# Patient Record
Sex: MALE
[De-identification: names, ages, dates, MRNs, and addresses within clinical notes are randomized per-mention and may not be internally consistent; named-entity substitution may affect disease eponyms.]

---

## 2023-10-20 ENCOUNTER — APPOINTMENT (OUTPATIENT)
Dept: INTERNAL MEDICINE | Facility: CLINIC | Age: 58
End: 2023-10-20

## 2023-12-01 ENCOUNTER — APPOINTMENT (OUTPATIENT)
Dept: INTERNAL MEDICINE | Facility: CLINIC | Age: 58
End: 2023-12-01

## 2024-07-01 ENCOUNTER — TRANSCRIPTION ENCOUNTER (OUTPATIENT)
Age: 59
End: 2024-07-01

## 2024-07-01 ENCOUNTER — APPOINTMENT (OUTPATIENT)
Dept: MRI IMAGING | Facility: HOSPITAL | Age: 59
End: 2024-07-01

## 2024-07-09 ENCOUNTER — APPOINTMENT (OUTPATIENT)
Dept: UROLOGY | Facility: CLINIC | Age: 59
End: 2024-07-09

## 2024-07-09 DIAGNOSIS — Z80.42 FAMILY HISTORY OF MALIGNANT NEOPLASM OF PROSTATE: ICD-10-CM

## 2024-07-09 DIAGNOSIS — R97.20 ELEVATED PROSTATE, SPECIFIC ANTIGEN [PSA]: ICD-10-CM

## 2024-07-09 DIAGNOSIS — A60.00 HERPESVIRAL INFECTION OF UROGENITAL SYSTEM, UNSPECIFIED: ICD-10-CM

## 2024-07-09 DIAGNOSIS — R39.9 UNSPECIFIED SYMPTOMS AND SIGNS INVOLVING THE GENITOURINARY SYSTEM: ICD-10-CM

## 2024-07-11 LAB — BACTERIA UR CULT: NORMAL

## 2024-08-06 ENCOUNTER — TRANSCRIPTION ENCOUNTER (OUTPATIENT)
Age: 59
End: 2024-08-06

## 2024-08-07 ENCOUNTER — APPOINTMENT (OUTPATIENT)
Dept: UROLOGY | Facility: AMBULATORY SURGERY CENTER | Age: 59
End: 2024-08-07

## 2024-08-07 ENCOUNTER — RESULT REVIEW (OUTPATIENT)
Age: 59
End: 2024-08-07

## 2024-08-07 ENCOUNTER — TRANSCRIPTION ENCOUNTER (OUTPATIENT)
Age: 59
End: 2024-08-07

## 2024-08-07 ENCOUNTER — NON-APPOINTMENT (OUTPATIENT)
Age: 59
End: 2024-08-07

## 2024-08-08 ENCOUNTER — NON-APPOINTMENT (OUTPATIENT)
Age: 59
End: 2024-08-08

## 2024-08-16 ENCOUNTER — APPOINTMENT (OUTPATIENT)
Dept: UROLOGY | Facility: CLINIC | Age: 59
End: 2024-08-16
Payer: COMMERCIAL

## 2024-08-16 ENCOUNTER — NON-APPOINTMENT (OUTPATIENT)
Age: 59
End: 2024-08-16

## 2024-08-16 DIAGNOSIS — C61 MALIGNANT NEOPLASM OF PROSTATE: ICD-10-CM

## 2024-08-16 PROCEDURE — G2211 COMPLEX E/M VISIT ADD ON: CPT | Mod: NC

## 2024-08-16 PROCEDURE — 99215 OFFICE O/P EST HI 40 MIN: CPT

## 2024-08-16 NOTE — LETTER BODY
[Please see my note below.] : Please see my note below. [FreeTextEntry2] : Mounika Hopson MD [FreeTextEntry1] :   Dear Doctor,     I had the opportunity to see your patient, Mr. JENNIFER HALL in followup. I am enclosing my office note for your information.   I will keep you informed of any developments.   Feel free to contact me if you have any questions.   Sincerely,   Chase Puga MD, FACS Professor of Urology [facility], [place] [facility] of [place] [address]   Office Telephone [phone]   Fax [phone]

## 2024-08-16 NOTE — ASSESSMENT
[FreeTextEntry1] :  Patient presents with a strong family history of prostate cancer.  His brother developed prostate cancer at the age of 50 and underwent a radical prostatectomy.  His father developed in his 70s and was treated with radiation therapy and androgen deprivation therapy..  His mother had breast cancer and his sister underwent BRCA screening which demonstrated positive gene.  We discussed the potential significance of this.  He has a daughter.  We did discuss the need for continuous monitoring of this PSA.  The patient is acutely aware of this.  His PSAs over the last 10 years are as outlined below: patient reports  PSA 10 years ago 1.1          9 years ago  1.7          8 years ago 2.1          5 years ago 2.7          1 year ago  3..0  We will assess his current PSA. We discussed BRCA testing Patient does not want to assess at this time  DIscussed LUTS flow 20.1 ; voided 175; 110 cc residual Pt understands that some of the most common side effect  of this medication include dizziness, dry mouth, fatigue, lightheadedness, palpitations. Pt informed to stop the medication should any of these occur.  Discussed "retrograde ejaculation" failure of emission from medication. 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 We discussed that procedure is performed by placing a rectal probe and administering anesthesia locally. Biopsies are then taken with a needle. Multiple biopsies are taken,  Risks of the procedure include hematuria, hematospermia, blood per rectum. Risks include infection, sepsis and even death. We discussed perineal vs transrectal biopsy We discussed decreased risk of perineal biopsy vs transrectal but associated greater discomfort  We discussed options of: 1. local, 2. IV sedation in Operating room 3. po Valium Fleets enema should be taken. ASA and NSAID must be stopped 1 week prior to procedure.    8.16.2024 patient returns re BIOPSY  Final Diagnosis 1. Prostate, left anterior apex, needle biopsy -Benign prostate tissue. 2. Prostate, left anterior base, needle biopsy -Benign prostate tissue. 3. Prostate, right anterior apex, needle biopsy -Benign prostate tissue. 4. Prostate, right anterior base, needle biopsy -Benign prostate tissue. 5. Prostate, midline apex, needle biopsy -Adenocarcinoma of the prostate, Prognostic Grade Group 1 ([place] score 3+3=6) involving 70% (7 mm in length) of 1 core. The diagnosis of carcinoma is supported by the failure of immunohistochemical staining for high molecular weight cytokeratin and p63 to demonstrate basal cells in the atypical glands, and the immunohistochemical stain for alpha-methylacyl-CoA racemase (P504S), a marker preferentially expressed in prostate cancer, is positive.  7. Prostate, left posterior apex, needle biopsy -Adenocarcinoma of the prostate, Prognostic Grade Group 1 (Reema score 3+3=6) involving 70% (7 mm in length) of 1 core.  8. Prostate, left posterior base, needle biopsy -Adenocarcinoma of the prostate, Prognostic Grade Group 1 ([place] score 3+3=6) involving 10% (1 mm in length) of 1 core.  9. Prostate, right posterior apex, needle biopsy -Adenocarcinoma of the prostate, Prognostic Grade Group 1 (Reema score 3+3=6) involving 10% (1 mm in length) of 1 core.  10. Prostate, right posterior base, needle biopsy -Adenocarcinoma of the prostate, Prognostic Grade Group 1 ([place] score 3+3=6) involving 10% (1 mm in length) of 1 core. The diagnosis of carcinoma is supported by the failure of immunohistochemical staining for high molecular weight cytokeratin and p63 to demonstrate basal cells in the atypical glands, and the immunohistochemical stain for alpha-methylacyl-CoA racemase (P504S), a marker preferentially expressed in prostate cancer, is positive.  11. Prostate, left lateral, needle biopsy -Prostate tissue with a small focus of atypical glands.  By itself, negative staining for high molecular weight cytokeratin and P63 in a small focus of glands, as seen in this case, is not diagnostic of cancer. The immunohistochemical stain for alpha-methyacyl-coA racemase (P504S) is positive.  12. Prostate, right lateral, needle biopsy -Adenocarcinoma of the prostate, Prognostic Grade Group 1 ([place] score 3+3=6) involving 5% (<1 mm in length) of 1 core.  13. Prostate, target, right peripheral, needle biopsy -Adenocarcinoma of the prostate, Prognostic Grade Group 2 (Reema score 3+4=7) involving 10%, 10%, 60%, 20% and 70% (1 mm, 1 mm, 10 mm, 3 mm and 12 mm in length) of 5/5 cores. -[place] pattern 4 comprises less than 10% of tumor. -Perineural invasion is identified. The diagnosis of carcinoma is supported by the failure of immunohistochemical staining for high molecular weight cytokeratin and p63 to demonstrate basal cells in the atypical glands, and the immunohistochemical stain for alpha-methylacyl-CoA racemase (P504S), a marker preferentially expressed in prostate cancer, is positive.  Note: No large cribriform Reema pattern 4 identified in this case.  We reviewed significance of GLEASONS SCORE   We discussed GRADE GROUP regarding risk stratification  Favorable intermediate	[id number] OR  [place] score 3+4 = 7/grade group 2 OR  PSA 10 to 20 ng/mL  AND Percentage of positive biopsy cores <50% 	   PROSTATE CANCER STAGING PSA: Reema's score, number of cores, extent of cores  We discussed risk classification based on Reema's score, PSA and % of cores positive Favorable intermediate	[id number] OR  Reema score 3+4 = 7/grade group 2 OR  PSA 10 to 20 ng/mL  AND Percentage of positive biopsy cores <50% 	 Unfavorable intermediate	[id number] OR  Reema score 3+4 = 7/grade group 2 or [place] score 4+3 = 7/grade group 3 OR  PSA 10 to 20 ng/mL 	   We discussed treatment options:  1. RT and radical prostatectomy are both appropriate  options for men with intermediate-risk disease.  2.  Active surveillance is an option for for those with favorable intermediate-risk disease. Patient informed that this comes with a higher risk of developing metastases compared  with definitive treatment.   The choice of a specific approach requires a  consideration of the benefits and risks associated with each approach, taking  into account the patient's age, individual preferences and comorbidities, and  estimated life expectancy.  We had an extensive conversation regarding   Risks and complications of each were discussed with special reference to sexual and urinary function  We discussed fact that I had presented his case to [facility] this am and the overall sentiment was to proceed with therapy based up: 1. family history 2. patients demonstrated concern (actively tracking PSA) 3. extensive 6 and reema 7 (3+4) However active surveillance would not be unreasonable if patient understands and accepts risks of missed opportunity for cure and regimen of active surveillance (repeat MRI and Biopsies as indicated) I offered referral to radiation oncology and surgery Patient declined at this time and want to "educate" himself first We discussed importance of DECIPHER score in decision making He will return in 3 weeks to review options and decipher score at that time

## 2024-08-16 NOTE — END OF VISIT
[Time Spent: ___ minutes] : I have spent [unfilled] minutes of time on the encounter.
Statement Selected

## 2024-08-16 NOTE — HISTORY OF PRESENT ILLNESS
[FreeTextEntry1] : 59 year old  single straight not sexually actve presents : 1 with brother (+) PCA at age of 50s; treated with RALP 2. father with prostate cancer at 79 treated with ADT and ERT 3. sister underwent BRCA testing   7.9.2024 on flomax IPSS 21 patient aware of improved flow  when forgets flow decreased

## 2024-08-16 NOTE — LETTER BODY
[Please see my note below.] : Please see my note below. [FreeTextEntry2] : Mounika Hopson MD [FreeTextEntry1] :   Dear Doctor,     I had the opportunity to see your patient, Mr. JENNIFER HALL in followup. I am enclosing my office note for your information.   I will keep you informed of any developments.   Feel free to contact me if you have any questions.   Sincerely,   Chase Puga MD, FACS Professor of Urology [facility] of [place] [address]   Office Telephone [phone]   Fax [phone]

## 2024-08-16 NOTE — ASSESSMENT
[FreeTextEntry1] :  Patient presents with a strong family history of prostate cancer.  His brother developed prostate cancer at the age of 50 and underwent a radical prostatectomy.  His father developed in his 70s and was treated with radiation therapy and androgen deprivation therapy..  His mother had breast cancer and his sister underwent BRCA screening which demonstrated positive gene.  We discussed the potential significance of this.  He has a daughter.  We did discuss the need for continuous monitoring of this PSA.  The patient is acutely aware of this.  His PSAs over the last 10 years are as outlined below: patient reports  PSA 10 years ago 1.1          9 years ago  1.7          8 years ago 2.1          5 years ago 2.7          1 year ago  3..0  We will assess his current PSA. We discussed BRCA testing Patient does not want to assess at this time  DIscussed LUTS flow 20.1 ; voided 175; 110 cc residual Pt understands that some of the most common side effect  of this medication include dizziness, dry mouth, fatigue, lightheadedness, palpitations. Pt informed to stop the medication should any of these occur.  Discussed "retrograde ejaculation" failure of emission from medication. 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 We discussed that procedure is performed by placing a rectal probe and administering anesthesia locally. Biopsies are then taken with a needle. Multiple biopsies are taken,  Risks of the procedure include hematuria, hematospermia, blood per rectum. Risks include infection, sepsis and even death. We discussed perineal vs transrectal biopsy We discussed decreased risk of perineal biopsy vs transrectal but associated greater discomfort  We discussed options of: 1. local, 2. IV sedation in Operating room 3. po Valium Fleets enema should be taken. ASA and NSAID must be stopped 1 week prior to procedure.    8.16.2024 patient returns re BIOPSY  Final Diagnosis 1. Prostate, left anterior apex, needle biopsy -Benign prostate tissue. 2. Prostate, left anterior base, needle biopsy -Benign prostate tissue. 3. Prostate, right anterior apex, needle biopsy -Benign prostate tissue. 4. Prostate, right anterior base, needle biopsy -Benign prostate tissue. 5. Prostate, midline apex, needle biopsy -Adenocarcinoma of the prostate, Prognostic Grade Group 1 (Lincoln score 3+3=6) involving 70% (7 mm in length) of 1 core. The diagnosis of carcinoma is supported by the failure of immunohistochemical staining for high molecular weight cytokeratin and p63 to demonstrate basal cells in the atypical glands, and the immunohistochemical stain for alpha-methylacyl-CoA racemase (P504S), a marker preferentially expressed in prostate cancer, is positive.  7. Prostate, left posterior apex, needle biopsy -Adenocarcinoma of the prostate, Prognostic Grade Group 1 (Reema score 3+3=6) involving 70% (7 mm in length) of 1 core.  8. Prostate, left posterior base, needle biopsy -Adenocarcinoma of the prostate, Prognostic Grade Group 1 (Lincoln score 3+3=6) involving 10% (1 mm in length) of 1 core.  9. Prostate, right posterior apex, needle biopsy -Adenocarcinoma of the prostate, Prognostic Grade Group 1 (Reema score 3+3=6) involving 10% (1 mm in length) of 1 core.  10. Prostate, right posterior base, needle biopsy -Adenocarcinoma of the prostate, Prognostic Grade Group 1 (Lincoln score 3+3=6) involving 10% (1 mm in length) of 1 core. The diagnosis of carcinoma is supported by the failure of immunohistochemical staining for high molecular weight cytokeratin and p63 to demonstrate basal cells in the atypical glands, and the immunohistochemical stain for alpha-methylacyl-CoA racemase (P504S), a marker preferentially expressed in prostate cancer, is positive.  11. Prostate, left lateral, needle biopsy -Prostate tissue with a small focus of atypical glands.  By itself, negative staining for high molecular weight cytokeratin and P63 in a small focus of glands, as seen in this case, is not diagnostic of cancer. The immunohistochemical stain for alpha-methyacyl-coA racemase (P504S) is positive.  12. Prostate, right lateral, needle biopsy -Adenocarcinoma of the prostate, Prognostic Grade Group 1 (Lincoln score 3+3=6) involving 5% (<1 mm in length) of 1 core.  13. Prostate, target, right peripheral, needle biopsy -Adenocarcinoma of the prostate, Prognostic Grade Group 2 (Reema score 3+4=7) involving 10%, 10%, 60%, 20% and 70% (1 mm, 1 mm, 10 mm, 3 mm and 12 mm in length) of 5/5 cores. -Lincoln pattern 4 comprises less than 10% of tumor. -Perineural invasion is identified. The diagnosis of carcinoma is supported by the failure of immunohistochemical staining for high molecular weight cytokeratin and p63 to demonstrate basal cells in the atypical glands, and the immunohistochemical stain for alpha-methylacyl-CoA racemase (P504S), a marker preferentially expressed in prostate cancer, is positive.  Note: No large cribriform Reema pattern 4 identified in this case.  We reviewed significance of GLEASONS SCORE   We discussed GRADE GROUP regarding risk stratification  Favorable intermediate	[id number] OR  Lincoln score 3+4 = 7/grade group 2 OR  PSA 10 to 20 ng/mL  AND Percentage of positive biopsy cores <50% 	   PROSTATE CANCER STAGING PSA: Reema's score, number of cores, extent of cores  We discussed risk classification based on Reema's score, PSA and % of cores positive Favorable intermediate	[id number] OR  Reema score 3+4 = 7/grade group 2 OR  PSA 10 to 20 ng/mL  AND Percentage of positive biopsy cores <50% 	 Unfavorable intermediate	[id number] OR  Reema score 3+4 = 7/grade group 2 or Lincoln score 4+3 = 7/grade group 3 OR  PSA 10 to 20 ng/mL 	   We discussed treatment options:  1. RT and radical prostatectomy are both appropriate  options for men with intermediate-risk disease.  2.  Active surveillance is an option for for those with favorable intermediate-risk disease. Patient informed that this comes with a higher risk of developing metastases compared  with definitive treatment.   The choice of a specific approach requires a  consideration of the benefits and risks associated with each approach, taking  into account the patient's age, individual preferences and comorbidities, and  estimated life expectancy.  We had an extensive conversation regarding   Risks and complications of each were discussed with special reference to sexual and urinary function  We discussed fact that I had presented his case to [facility] this am and the overall sentiment was to proceed with therapy based up: 1. family history 2. patients demonstrated concern (actively tracking PSA) 3. extensive 6 and reema 7 (3+4) However active surveillance would not be unreasonable if patient understands and accepts risks of missed opportunity for cure and regimen of active surveillance (repeat MRI and Biopsies as indicated) I offered referral to radiation oncology and surgery Patient declined at this time and want to "educate" himself first We discussed importance of DECIPHER score in decision making He will return in 3 weeks to review options and decipher score at that time

## 2024-08-16 NOTE — LETTER BODY
[Please see my note below.] : Please see my note below. [FreeTextEntry2] : Mounika Hopson MD [FreeTextEntry1] :   Dear Doctor,     I had the opportunity to see your patient, Mr. JENNIFER HALL in followup. I am enclosing my office note for your information.   I will keep you informed of any developments.   Feel free to contact me if you have any questions.   Sincerely,   Chase Puga MD, FACS Professor of Urology [facility] of [place] [address]   Office Telephone [phone]   Fax [phone]

## 2024-08-16 NOTE — ASSESSMENT
[FreeTextEntry1] :  Patient presents with a strong family history of prostate cancer.  His brother developed prostate cancer at the age of 50 and underwent a radical prostatectomy.  His father developed in his 70s and was treated with radiation therapy and androgen deprivation therapy..  His mother had breast cancer and his sister underwent BRCA screening which demonstrated positive gene.  We discussed the potential significance of this.  He has a daughter.  We did discuss the need for continuous monitoring of this PSA.  The patient is acutely aware of this.  His PSAs over the last 10 years are as outlined below: patient reports  PSA 10 years ago 1.1          9 years ago  1.7          8 years ago 2.1          5 years ago 2.7          1 year ago  3..0  We will assess his current PSA. We discussed BRCA testing Patient does not want to assess at this time  DIscussed LUTS flow 20.1 ; voided 175; 110 cc residual Pt understands that some of the most common side effect  of this medication include dizziness, dry mouth, fatigue, lightheadedness, palpitations. Pt informed to stop the medication should any of these occur.  Discussed "retrograde ejaculation" failure of emission from medication. 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 We discussed that procedure is performed by placing a rectal probe and administering anesthesia locally. Biopsies are then taken with a needle. Multiple biopsies are taken,  Risks of the procedure include hematuria, hematospermia, blood per rectum. Risks include infection, sepsis and even death. We discussed perineal vs transrectal biopsy We discussed decreased risk of perineal biopsy vs transrectal but associated greater discomfort  We discussed options of: 1. local, 2. IV sedation in Operating room 3. po Valium Fleets enema should be taken. ASA and NSAID must be stopped 1 week prior to procedure.    8.16.2024 patient returns re BIOPSY  Final Diagnosis 1. Prostate, left anterior apex, needle biopsy -Benign prostate tissue. 2. Prostate, left anterior base, needle biopsy -Benign prostate tissue. 3. Prostate, right anterior apex, needle biopsy -Benign prostate tissue. 4. Prostate, right anterior base, needle biopsy -Benign prostate tissue. 5. Prostate, midline apex, needle biopsy -Adenocarcinoma of the prostate, Prognostic Grade Group 1 (Salcha score 3+3=6) involving 70% (7 mm in length) of 1 core. The diagnosis of carcinoma is supported by the failure of immunohistochemical staining for high molecular weight cytokeratin and p63 to demonstrate basal cells in the atypical glands, and the immunohistochemical stain for alpha-methylacyl-CoA racemase (P504S), a marker preferentially expressed in prostate cancer, is positive.  7. Prostate, left posterior apex, needle biopsy -Adenocarcinoma of the prostate, Prognostic Grade Group 1 (Reema score 3+3=6) involving 70% (7 mm in length) of 1 core.  8. Prostate, left posterior base, needle biopsy -Adenocarcinoma of the prostate, Prognostic Grade Group 1 (Salcha score 3+3=6) involving 10% (1 mm in length) of 1 core.  9. Prostate, right posterior apex, needle biopsy -Adenocarcinoma of the prostate, Prognostic Grade Group 1 (Reema score 3+3=6) involving 10% (1 mm in length) of 1 core.  10. Prostate, right posterior base, needle biopsy -Adenocarcinoma of the prostate, Prognostic Grade Group 1 (Salcha score 3+3=6) involving 10% (1 mm in length) of 1 core. The diagnosis of carcinoma is supported by the failure of immunohistochemical staining for high molecular weight cytokeratin and p63 to demonstrate basal cells in the atypical glands, and the immunohistochemical stain for alpha-methylacyl-CoA racemase (P504S), a marker preferentially expressed in prostate cancer, is positive.  11. Prostate, left lateral, needle biopsy -Prostate tissue with a small focus of atypical glands.  By itself, negative staining for high molecular weight cytokeratin and P63 in a small focus of glands, as seen in this case, is not diagnostic of cancer. The immunohistochemical stain for alpha-methyacyl-coA racemase (P504S) is positive.  12. Prostate, right lateral, needle biopsy -Adenocarcinoma of the prostate, Prognostic Grade Group 1 (Salcha score 3+3=6) involving 5% (<1 mm in length) of 1 core.  13. Prostate, target, right peripheral, needle biopsy -Adenocarcinoma of the prostate, Prognostic Grade Group 2 (Reema score 3+4=7) involving 10%, 10%, 60%, 20% and 70% (1 mm, 1 mm, 10 mm, 3 mm and 12 mm in length) of 5/5 cores. -Salcha pattern 4 comprises less than 10% of tumor. -Perineural invasion is identified. The diagnosis of carcinoma is supported by the failure of immunohistochemical staining for high molecular weight cytokeratin and p63 to demonstrate basal cells in the atypical glands, and the immunohistochemical stain for alpha-methylacyl-CoA racemase (P504S), a marker preferentially expressed in prostate cancer, is positive.  Note: No large cribriform Reema pattern 4 identified in this case.  We reviewed significance of GLEASONS SCORE   We discussed GRADE GROUP regarding risk stratification  Favorable intermediate	[id number] OR  Salcha score 3+4 = 7/grade group 2 OR  PSA 10 to 20 ng/mL  AND Percentage of positive biopsy cores <50% 	   PROSTATE CANCER STAGING PSA: Reema's score, number of cores, extent of cores  We discussed risk classification based on Reema's score, PSA and % of cores positive Favorable intermediate	[id number]-[id number] OR  Reema score 3+4 = 7/grade group 2 OR  PSA 10 to 20 ng/mL  AND Percentage of positive biopsy cores <50% 	 Unfavorable intermediate	[id number] OR  Reema score 3+4 = 7/grade group 2 or Salcha score 4+3 = 7/grade group 3 OR  PSA 10 to 20 ng/mL 	   We discussed treatment options:  1. RT and radical prostatectomy are both appropriate  options for men with intermediate-risk disease.  2.  Active surveillance is an option for for those with favorable intermediate-risk disease. Patient informed that this comes with a higher risk of developing metastases compared  with definitive treatment.   The choice of a specific approach requires a  consideration of the benefits and risks associated with each approach, taking  into account the patient's age, individual preferences and comorbidities, and  estimated life expectancy.  We had an extensive conversation regarding   Risks and complications of each were discussed with special reference to sexual and urinary function  We discussed fact that I had presented his case to [facility] this am and the overall sentiment was to proceed with therapy based up: 1. family history 2. patients demonstrated concern (actively tracking PSA) 3. extensive 6 and reema 7 (3+4) However active surveillance would not be unreasonable if patient understands and accepts risks of missed opportunity for cure and regimen of active surveillance (repeat MRI and Biopsies as indicated) I offered referral to radiation oncology and surgery Patient declined at this time and want to "educate" himself first We discussed importance of DECIPHER score in decision making He will return in 3 weeks to review options and decipher score at that time

## 2024-09-06 ENCOUNTER — APPOINTMENT (OUTPATIENT)
Dept: UROLOGY | Facility: CLINIC | Age: 59
End: 2024-09-06
Payer: COMMERCIAL

## 2024-09-06 VITALS
HEART RATE: 81 BPM | SYSTOLIC BLOOD PRESSURE: 140 MMHG | TEMPERATURE: 97.3 F | BODY MASS INDEX: 28.99 KG/M2 | WEIGHT: 214 LBS | DIASTOLIC BLOOD PRESSURE: 96 MMHG | OXYGEN SATURATION: 95 % | HEIGHT: 72 IN

## 2024-09-06 DIAGNOSIS — C61 MALIGNANT NEOPLASM OF PROSTATE: ICD-10-CM

## 2024-09-06 PROCEDURE — 99214 OFFICE O/P EST MOD 30 MIN: CPT

## 2024-09-06 NOTE — LETTER BODY
[Please see my note below.] : Please see my note below. [FreeTextEntry2] : Mounika Hopson MD [FreeTextEntry1] :   Dear Doctor,     I had the opportunity to see your patient, Mr. JENNIFER HALL in followup. I am enclosing my office note for your information.   I will keep you informed of any developments.   Feel free to contact me if you have any questions.   Sincerely,   Chase Puga MD, FACS Professor of Urology [facility] [place] [address]   Office Telephone [phone]   Fax [phone]

## 2024-09-06 NOTE — ASSESSMENT
[FreeTextEntry1] :  Patient presents with a strong family history of prostate cancer.  His brother developed prostate cancer at the age of 50 and underwent a radical prostatectomy.  His father developed in his 70s and was treated with radiation therapy and androgen deprivation therapy..  His mother had breast cancer and his sister underwent BRCA screening which demonstrated positive gene.  We discussed the potential significance of this.  He has a daughter.  We did discuss the need for continuous monitoring of this PSA.  The patient is acutely aware of this.  His PSAs over the last 10 years are as outlined below: patient reports  PSA 10 years ago 1.1          9 years ago  1.7          8 years ago 2.1          5 years ago 2.7          1 year ago  3..0  We will assess his current PSA. We discussed BRCA testing Patient does not want to assess at this time  DIscussed LUTS flow 20.1 ; voided 175; 110 cc residual Pt understands that some of the most common side effect  of this medication include dizziness, dry mouth, fatigue, lightheadedness, palpitations. Pt informed to stop the medication should any of these occur.  Discussed "retrograde ejaculation" failure of emission from medication. 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 We discussed that procedure is performed by placing a rectal probe and administering anesthesia locally. Biopsies are then taken with a needle. Multiple biopsies are taken,  Risks of the procedure include hematuria, hematospermia, blood per rectum. Risks include infection, sepsis and even death. We discussed perineal vs transrectal biopsy We discussed decreased risk of perineal biopsy vs transrectal but associated greater discomfort  We discussed options of: 1. local, 2. IV sedation in Operating room 3. po Valium Fleets enema should be taken. ASA and NSAID must be stopped 1 week prior to procedure.    8.16.2024 patient returns re BIOPSY  Final Diagnosis 1. Prostate, left anterior apex, needle biopsy -Benign prostate tissue. 2. Prostate, left anterior base, needle biopsy -Benign prostate tissue. 3. Prostate, right anterior apex, needle biopsy -Benign prostate tissue. 4. Prostate, right anterior base, needle biopsy -Benign prostate tissue. 5. Prostate, midline apex, needle biopsy -Adenocarcinoma of the prostate, Prognostic Grade Group 1 ([place] score 3+3=6) involving 70% (7 mm in length) of 1 core. The diagnosis of carcinoma is supported by the failure of immunohistochemical staining for high molecular weight cytokeratin and p63 to demonstrate basal cells in the atypical glands, and the immunohistochemical stain for alpha-methylacyl-CoA racemase (P504S), a marker preferentially expressed in prostate cancer, is positive.  7. Prostate, left posterior apex, needle biopsy -Adenocarcinoma of the prostate, Prognostic Grade Group 1 (Reema score 3+3=6) involving 70% (7 mm in length) of 1 core.  8. Prostate, left posterior base, needle biopsy -Adenocarcinoma of the prostate, Prognostic Grade Group 1 ([place] score 3+3=6) involving 10% (1 mm in length) of 1 core.  9. Prostate, right posterior apex, needle biopsy -Adenocarcinoma of the prostate, Prognostic Grade Group 1 (Reema score 3+3=6) involving 10% (1 mm in length) of 1 core.  10. Prostate, right posterior base, needle biopsy -Adenocarcinoma of the prostate, Prognostic Grade Group 1 ([place] score 3+3=6) involving 10% (1 mm in length) of 1 core. The diagnosis of carcinoma is supported by the failure of immunohistochemical staining for high molecular weight cytokeratin and p63 to demonstrate basal cells in the atypical glands, and the immunohistochemical stain for alpha-methylacyl-CoA racemase (P504S), a marker preferentially expressed in prostate cancer, is positive.  11. Prostate, left lateral, needle biopsy -Prostate tissue with a small focus of atypical glands.  By itself, negative staining for high molecular weight cytokeratin and P63 in a small focus of glands, as seen in this case, is not diagnostic of cancer. The immunohistochemical stain for alpha-methyacyl-coA racemase (P504S) is positive.  12. Prostate, right lateral, needle biopsy -Adenocarcinoma of the prostate, Prognostic Grade Group 1 ([place] score 3+3=6) involving 5% (<1 mm in length) of 1 core.  13. Prostate, target, right peripheral, needle biopsy -Adenocarcinoma of the prostate, Prognostic Grade Group 2 (Reema score 3+4=7) involving 10%, 10%, 60%, 20% and 70% (1 mm, 1 mm, 10 mm, 3 mm and 12 mm in length) of 5/5 cores. -[place] pattern 4 comprises less than 10% of tumor. -Perineural invasion is identified. The diagnosis of carcinoma is supported by the failure of immunohistochemical staining for high molecular weight cytokeratin and p63 to demonstrate basal cells in the atypical glands, and the immunohistochemical stain for alpha-methylacyl-CoA racemase (P504S), a marker preferentially expressed in prostate cancer, is positive.  Note: No large cribriform Reema pattern 4 identified in this case.  We reviewed significance of GLEASONS SCORE   We discussed GRADE GROUP regarding risk stratification  Favorable intermediate	[id number]-[id number] OR  [place] score 3+4 = 7/grade group 2 OR  PSA 10 to 20 ng/mL  AND Percentage of positive biopsy cores <50% 	   PROSTATE CANCER STAGING PSA: Reema's score, number of cores, extent of cores  We discussed risk classification based on Reema's score, PSA and % of cores positive Favorable intermediate	R5t-C6d OR  Reema score 3+4 = 7/grade group 2 OR  PSA 10 to 20 ng/mL  AND Percentage of positive biopsy cores <50% 	 Unfavorable intermediate	G4m-W0c OR  Reema score 3+4 = 7/grade group 2 or [place] score 4+3 = 7/grade group 3 OR  PSA 10 to 20 ng/mL 	   We discussed treatment options:  1. RT and radical prostatectomy are both appropriate  options for men with intermediate-risk disease.  2.  Active surveillance is an option for for those with favorable intermediate-risk disease. Patient informed that this comes with a higher risk of developing metastases compared  with definitive treatment.   The choice of a specific approach requires a  consideration of the benefits and risks associated with each approach, taking  into account the patient's age, individual preferences and comorbidities, and  estimated life expectancy.  We had an extensive conversation regarding   Risks and complications of each were discussed with special reference to sexual and urinary function  We discussed fact that I had presented his case to [facility] this am and the overall sentiment was to proceed with therapy based up: 1. family history 2. patients demonstrated concern (actively tracking PSA) 3. extensive 6 and reema 7 (3+4) However active surveillance would not be unreasonable if patient understands and accepts risks of missed opportunity for cure and regimen of active surveillance (repeat MRI and Biopsies as indicated) I offered referral to radiation oncology and surgery Patient declined at this time and want to "educate" himself first We discussed importance of DECIPHER score in decision making He will return in 3 weeks to review options and decipher score at that time  9/6/2024  Patient returns after "educating himself" regarding prostate cancer.  We again reviewed his prior biopsy.  We again reviewed his staging.  We again reviewed the fact that he has Reema 7 in multiple areas of Reema 6.  We discussed his family history of prostate cancer.  We discussed the fact that his father had prostate cancer and his sister has a BRCA gene.  We discussed various treatment options including radiation therapy and radical prostatectomy.  We discussed the fact that efficacy of 1 versus the other has not been established.  We discussed the risk profile of radiation versus surgery with special regard to urinary incontinence erectile function.  We discussed the follow-up required after each of these therapies.  We discussed issues related to anxiety secondary to surveillance versus therapy.  Patient indicated that he is still in the learning phase.  He is inclined active surveillance and the risks associated with active surveillance including but not limited to failure to diagnosis a timely fashion to result in cure.  We discussed decipher score to integrate this into decision making.  I logged onto the [email or web address] website and documented that the tissue has been received (8/16/2024) however the report is not yet available.   Plan: 1 consultation Ed Mensah. 2.  Consultation Micheal Moran 3.  Await decipher score 4.  Follow-up after consultations as outlined above.

## 2024-09-06 NOTE — HISTORY OF PRESENT ILLNESS
[FreeTextEntry1] : 59 year old  single straight not sexually actve presents : 1 with brother (+) PCA at age of 50s; treated with RALP 2. father with prostate cancer at 79 treated with ADT and ERT 3. sister underwent BRCA testing   7.9.2024 on flomax IPSS 21 patient aware of improved flow  when forgets flow decreased  9.6.2024 returns after dicsussion We discussed fact that I had presented his case to [facility] this am and the overall sentiment was to proceed with therapy based up: 1. family history 2. patients demonstrated concern (actively tracking PSA) 3. extensive 6 and reema 7 (3+4) However active surveillance would not be unreasonable if patient understands and accepts risks of missed opportunity for cure and regimen of active surveillance (repeat MRI and Biopsies as indicated) I offered referral to radiation oncology and surgery

## 2024-09-24 ENCOUNTER — APPOINTMENT (OUTPATIENT)
Dept: UROLOGY | Facility: CLINIC | Age: 59
End: 2024-09-24
Payer: COMMERCIAL

## 2024-09-24 PROCEDURE — 99214 OFFICE O/P EST MOD 30 MIN: CPT

## 2024-09-24 NOTE — HISTORY OF PRESENT ILLNESS
[FreeTextEntry1] : CC: Prostate cancer   59 year old straight, single man, sexually active, erections are "ok".  He had umbilical hernia and bilateral groin with mesh   Postive family history for CaP - brother (+) PCA at age of 50s; treated with RALP.  Father with prostate cancer at 79 treated with ADT and ERT Sister underwent BRCA testing - positive for BRCA mutation  Extensive 6 and [place] 7 (3+4)

## 2024-09-24 NOTE — ASSESSMENT
[FreeTextEntry1] : Diagnosis:  CAP - intermediate risk   Today we discussed the natural history of localized prostate cancer, and the heterogeneous biology of this malignancy.  We discussed the fact that many prostate cancers are slow growing and unlikely to metastasize or cause death, whereas others can be life threatening.  We reviewed risk stratification, staging, [place] scoring, and PSA levels as they pertain to risk.    All treatment options were reviewed.  This included active surveillance, androgen deprivation, emerging options such as focal therapy/HIFU/cryotherapy, radiation options (including IMRT, SBRT, brachytherapy) and surgical options (open vs. robotic surgery, nerve vs. non-nerve sparing).  Oncologic outcomes were compared and contrasted.  Risks of biochemical and clinical recurrence discussed.  Risks of needing adjuvant or salvage treatments reviewed.  We discussed the risks of secondary malignancy after radiation.  We discussed the opportunity for pathological staging with surgery vs. other options.  We discussed the possibility of positive margins, treatment failure, cancer recurrence and cancer-related death even with treatment.   All potential side effects of treatment were reviewed including, but not limited to: short term or permanent stress urinary incontinence and/or short term or permanent erectile dysfunction, penile shortening, rectal symptoms/pain, perineal pain, and other side effects.   All potential complications of treatment and surgery were reviewed including, but not limited to: risks of conversion from MIS to open surgery discussed, bleeding//life-threatening hemorrhage, rectal injury requiring colostomy or delayed recognition leading to fistula, vascular/bowel/adjacent visceral organ injury, trocar/access injury, the possibility of recognized vs. unrecognized/delayed-recognition injury, risks of thermal/blunt/sharp/retraction injury, risks of DVT, PE, MI, death, risks of cardiopulmonary/anesthesia related complications, positional injury, infection/collection/abscess, wound complications/dehiscence/seroma/cellulitis, urinoma/fistula, ureteral injury/obstruction, bladder neck contracture, penile shortening, meatal stenosis, urethral stricture, lymphocele, obturator nerve injury, prolonged anastomotic leak, and other complications.    Discussed all options, as noted.  He is considering active surveillance   Micheal Moran MD, FACS, FRCS  of Urology [facility] Director of Laparoscopic and Robotic Surgery [facility] Director of Urology, [facility] Professor of Urology   (Office) [phone] (Cell) [phone] [email or web address]

## 2024-09-30 ENCOUNTER — NON-APPOINTMENT (OUTPATIENT)
Age: 59
End: 2024-09-30